# Patient Record
Sex: FEMALE | URBAN - METROPOLITAN AREA
[De-identification: names, ages, dates, MRNs, and addresses within clinical notes are randomized per-mention and may not be internally consistent; named-entity substitution may affect disease eponyms.]

---

## 2017-03-30 ENCOUNTER — VIRTUAL VISIT (OUTPATIENT)
Dept: FAMILY MEDICINE | Facility: OTHER | Age: 27
End: 2017-03-30

## 2017-03-30 NOTE — PROGRESS NOTES
Date:   Clinician: Maribel Jameson  Clinician NPI: [id number]  Patient: Sabina Rangel  Patient : 1990  Patient Address: [address] [place], [place] [address], [place] [address]  Patient Phone: [phone]  Visit Protocol: UTI  Patient Summary:  Sabina is a 26 year old ( : 1990 ) female who initiated a Zip for a presumed bladder infection.     Her symptoms began yesterday and consist of urgency, dysuria, urinary frequency, hematuria, and foul smelling urine.   Symptom Details     Urinary Frequency: Several times each hour     Hematuria: She has seen urine with occasional blood 1 time(s) since the onset of her symptoms. She is certain the blood is not from her vagina.      She denies nausea, loss of appetite, chills, fever, urinary incontinence, hesitation, vaginal discharge, abdominal pain, vomiting, recent antibiotic use, and flank pain. Sabina has never had kidney stones. She has not been hospitalized, been a patient in a nursing home, or had a catheter in the past two weeks. She denies risk factors for sexually transmitted infections.   Sabina has had one (1) UTI in the past 12 months. Her most recent bladder infection was not within the last 4 weeks. Her current symptoms are similar to the previous UTI symptoms. She took an antibiotic for her last infection but does not remember which one.   She has experienced side effects (upset stomach, vomiting, or diarrhea) from taking Bactrim DS (trimethoprim/sulfamethoxazole). Sabina does not get yeast infections when she takes antibiotics.   She states she is not pregnant and denies breastfeeding. She has menstruated in the past month.   She does NOT smoke or use smokeless tobacco.  MEDICATIONS:  No current medications   , ALLERGIES:   sulfa (Bactrim/Septra)    Clinician Response:  Dear Sabina,  Based on the information you have provided, you likely have a recurrent, uncomplicated bladder infection, also known as a urinary tract  infection (UTI).   To treat your infection, I am prescribing:   Nitrofurantoin (Macrobid). Swallow one (1) tablet twice a day for 5 days. Take the tablet with food. Continue taking the tablets even if you feel better before all the medication is gone. There is no refill with this prescription.   Some people develop allergies to antibiotics. If you notice a new rash, significant swelling, or difficulty breathing, stop the medication immediately and go into a clinic for physical evaluation.   To help treat your current UTI and prevent future occurrences, remember to:     Drink 8-10, 8-ounce glasses of water daily.    Urinate after sexual intercourse.    Wipe front to back after using the bathroom.     Some women may develop a yeast infection as a side effect of taking antibiotics. If you notice symptoms of a yeast infection, [facility] can help treat that condition as well. Simply log in and complete another Zip, which will cover all of the necessary questions to determine the best treatment for you.   You should visit a clinic for a follow-up visit if your symptoms do not improve in 1-2 days or if you experience another urinary tract infection soon after completing this treatment.  If you become pregnant during this course of treatment, stop taking the medication and contact your primary care clinician.   Diagnosis: Recurrent Bladder Infection  Diagnosis ICD: N39.0  Additional Clinician Notes:  Sabina, our records indicate that you were advised to be seen in clinic for abdominal pain earlier today. If this was a mistake please disregard, if you are having of abdominal pain as well as UTI symptoms you should be seen in clinic for a urine culture. Thank you for using [facility].   Prescription: nitrofurantoin (Macrobid) 100mg oral tablet 10 tablets, 5 days supply. Take one tablet by mouth two times a day for 5 days. Refills: 0, Refill as needed: no, Allow substitutions: yes  Prescription Sent At: March 30 11:37:51,  2017  Pharmacy: [id number] - [phone] - [id number], [place] [address]